# Patient Record
Sex: FEMALE | Race: OTHER | URBAN - METROPOLITAN AREA
[De-identification: names, ages, dates, MRNs, and addresses within clinical notes are randomized per-mention and may not be internally consistent; named-entity substitution may affect disease eponyms.]

---

## 2017-10-18 ENCOUNTER — TELEPHONE (OUTPATIENT)
Dept: FAMILY MEDICINE CLINIC | Facility: CLINIC | Age: 42
End: 2017-10-18

## 2017-10-18 NOTE — TELEPHONE ENCOUNTER
Received today, 10/18/17, via fax, a fax cover sheet from [facility] in [address] [facility] requesting mammography films and reports. No signed release from pt attached. Faxed back that we need signed release from pt.

## 2021-01-13 ENCOUNTER — TELEPHONE (OUTPATIENT)
Dept: FAMILY MEDICINE CLINIC | Facility: CLINIC | Age: 46
End: 2021-01-13

## 2021-01-13 NOTE — TELEPHONE ENCOUNTER
Pt states that she does not have any sxs of covid, the only reason she had a test done is because they are free at work.     Informed pt that it is fine for her to come into the office tomorrow

## 2021-01-13 NOTE — TELEPHONE ENCOUNTER
Re: has appointment tomorrow - Answered yes to COVID test  - Negative results - had test a week ago for Work.    Ok to keep appointment- Please advise

## 2021-01-14 ENCOUNTER — OFFICE VISIT (OUTPATIENT)
Dept: FAMILY MEDICINE CLINIC | Facility: CLINIC | Age: 46
End: 2021-01-14
Payer: COMMERCIAL

## 2021-01-14 VITALS
SYSTOLIC BLOOD PRESSURE: 120 MMHG | RESPIRATION RATE: 16 BRPM | TEMPERATURE: 98 F | OXYGEN SATURATION: 99 % | BODY MASS INDEX: 27.72 KG/M2 | HEART RATE: 88 BPM | HEIGHT: 60 IN | DIASTOLIC BLOOD PRESSURE: 82 MMHG | WEIGHT: 141.19 LBS

## 2021-01-14 DIAGNOSIS — Q89.9 UMBILICAL ABNORMALITY: Primary | ICD-10-CM

## 2021-01-14 PROCEDURE — 3079F DIAST BP 80-89 MM HG: CPT | Performed by: FAMILY MEDICINE

## 2021-01-14 PROCEDURE — 3074F SYST BP LT 130 MM HG: CPT | Performed by: FAMILY MEDICINE

## 2021-01-14 PROCEDURE — 99203 OFFICE O/P NEW LOW 30 MIN: CPT | Performed by: FAMILY MEDICINE

## 2021-01-14 PROCEDURE — 3008F BODY MASS INDEX DOCD: CPT | Performed by: FAMILY MEDICINE

## 2021-01-14 NOTE — PATIENT INSTRUCTIONS
Schedule CT scan. Return to clinic or go to ER if increase in size, pain, redness or swelling.    See Dr Suggs Becker if no improvement

## 2021-01-15 NOTE — PROGRESS NOTES
[facility] [place]  PROGRESS NOTE  Chief Complaint:   Patient presents with:  New Patient  Ultrasound: no hernia/ would like to know next steps. CT scan?  Dec 19      HPI:   This is a 54year old female presents to clinic to establish care and fo chest pressure, chest discomfort, palpitations, edema, dyspnea on exertion or at rest.  RESPIRATORY:  Denies shortness of breath, wheezing, cough or sputum.   GASTROINTESTINAL:  Denies abdominal pain, nausea, vomiting, constipation, diarrhea, or blood in st swelling.    See Dr Yobani Donnelly if no improvement           Health Maintenance:  Pneumococcal Vaccine: Birth to 64yrs(1 of 1 - PPSV23) due on 09/05/1980  Tobacco Cessation Counseling 2 Years due on 09/05/1986  Annual Physical due on 05/31/2014  Mammogram due on

## 2021-01-21 ENCOUNTER — TELEPHONE (OUTPATIENT)
Dept: FAMILY MEDICINE CLINIC | Facility: CLINIC | Age: 46
End: 2021-01-21

## 2021-01-21 NOTE — TELEPHONE ENCOUNTER
Pt notified and verbalized understanding. Per Dr. Sorin Salinas schedule appt to discuss results.        Future Appointments   Date Time Provider Radha Kim   1/22/2021  2:20 PM Abe Sky MD [facility] [place] [facility]

## 2021-01-22 ENCOUNTER — OFFICE VISIT (OUTPATIENT)
Dept: FAMILY MEDICINE CLINIC | Facility: CLINIC | Age: 46
End: 2021-01-22
Payer: COMMERCIAL

## 2021-01-22 VITALS
BODY MASS INDEX: 27.68 KG/M2 | HEIGHT: 60 IN | OXYGEN SATURATION: 100 % | RESPIRATION RATE: 16 BRPM | SYSTOLIC BLOOD PRESSURE: 118 MMHG | HEART RATE: 80 BPM | TEMPERATURE: 99 F | DIASTOLIC BLOOD PRESSURE: 70 MMHG | WEIGHT: 141 LBS

## 2021-01-22 DIAGNOSIS — K76.89 LIVER CYST: ICD-10-CM

## 2021-01-22 DIAGNOSIS — F17.210 CIGARETTE SMOKER: ICD-10-CM

## 2021-01-22 DIAGNOSIS — E27.8 ADRENAL MASS, RIGHT (HCC): ICD-10-CM

## 2021-01-22 DIAGNOSIS — K42.9 UMBILICAL HERNIA WITHOUT OBSTRUCTION AND WITHOUT GANGRENE: Primary | ICD-10-CM

## 2021-01-22 DIAGNOSIS — R91.8 MULTIPLE LUNG NODULES: ICD-10-CM

## 2021-01-22 PROCEDURE — 3078F DIAST BP <80 MM HG: CPT | Performed by: FAMILY MEDICINE

## 2021-01-22 PROCEDURE — 3008F BODY MASS INDEX DOCD: CPT | Performed by: FAMILY MEDICINE

## 2021-01-22 PROCEDURE — 3074F SYST BP LT 130 MM HG: CPT | Performed by: FAMILY MEDICINE

## 2021-01-22 PROCEDURE — 99214 OFFICE O/P EST MOD 30 MIN: CPT | Performed by: FAMILY MEDICINE

## 2021-01-22 RX ORDER — VARENICLINE TARTRATE 0.5 (11)-1
KIT ORAL
Qty: 1 PACKAGE | Refills: 0 | Status: SHIPPED | OUTPATIENT
Start: 2021-01-22

## 2021-01-22 RX ORDER — VARENICLINE TARTRATE 1 MG/1
1 TABLET, FILM COATED ORAL 2 TIMES DAILY
Qty: 1 PACKAGE | Refills: 1 | Status: SHIPPED | OUTPATIENT
Start: 2021-02-21

## 2021-01-22 NOTE — PROGRESS NOTES
[address]  PROGRESS NOTE  Chief Complaint:   Patient presents with: Follow - Up: follow up of CT      HPI:   This is a 54year old female presents to clinic to discuss recent CT scan.   Patient had CT scan done that shows that she has n congestion, sinus pain or sore throat; hearing loss negative  INTEGUMENTARY:  Denies rashes, itching, skin lesion, or excessive skin dryness.   CARDIOVASCULAR:  Denies chest pain, chest pressure, chest discomfort, palpitations, edema, dyspnea on exertion or (CONTRAST ONLY) ABDOMEN (W+WO) PELVIS (CONTRAST ONLY); Future    Cigarette smoker  -     CT CHEST (CONTRAST ONLY) ABDOMEN (W+WO) PELVIS (CONTRAST ONLY);  Future    Adrenal mass, right ([address]  Comments:  2 cm, 1/21/21 CT  Orders:  -     ENDOCRINOLOGY - EXTERN created utilizing Dragon speech recognition software.  Please excuse any grammatical errors. Call my office if you have any questions regarding this note.

## 2021-01-22 NOTE — PATIENT INSTRUCTIONS
Discussed CT scan result. Umbilical hernia, Multiple lung nodule, liver cyst and adrenal mass. Recommend to quit smoking. Start chantix. See surgeon and endocrinologist.     Cristóbal Baker in 6 months to have CT of chest and abdomen.    Return sooner if

## 2021-02-05 ENCOUNTER — TELEPHONE (OUTPATIENT)
Dept: FAMILY MEDICINE CLINIC | Facility: CLINIC | Age: 46
End: 2021-02-05

## 2021-02-05 NOTE — TELEPHONE ENCOUNTER
Re:  recent visit 1/14 & any other visits / tests   with Dr. Pavan Prado  - Needs her office visits  re:  hernia for her Job. patient is opening up a work com claim.    Please fax informtion #  [phone] Attn:  Samira Molina

## 2021-02-06 NOTE — TELEPHONE ENCOUNTER
Pt came into the office and signed a records release. I am now faxing the requested information to [facility], for her workman's comp.  Claim

## 2021-02-12 ENCOUNTER — TELEPHONE (OUTPATIENT)
Dept: FAMILY MEDICINE CLINIC | Facility: CLINIC | Age: 46
End: 2021-02-12

## 2021-02-12 NOTE — TELEPHONE ENCOUNTER
Pt submitted an authorization to release her records from 1/14/2021 to 1/22/2021 to Leandra. This was sent to [employer].

## 2021-02-26 NOTE — TELEPHONE ENCOUNTER
Pt submitted an authorization to release her records from 1/14/2021 to 1/22/2021 to Leandra. This was sent to [employer].

## 2024-03-26 ENCOUNTER — PATIENT OUTREACH (OUTPATIENT)
Dept: CASE MANAGEMENT | Age: 49
End: 2024-03-26

## 2024-03-26 NOTE — PROCEDURES
The office order for PCP removal request is Approved and finalized on March 26, 2024.    Thanks,  [facility] Team

## (undated) NOTE — LETTER
Date: 1/14/2021    Patient Name: Mini Hameed          To Whom it may concern: This letter has been written at the patient's request. The above patient was seen at the [facility] for treatment of a medical condition.     Patient has umbil